# Patient Record
Sex: MALE | Race: OTHER | HISPANIC OR LATINO | URBAN - METROPOLITAN AREA
[De-identification: names, ages, dates, MRNs, and addresses within clinical notes are randomized per-mention and may not be internally consistent; named-entity substitution may affect disease eponyms.]

---

## 2017-12-01 ENCOUNTER — EMERGENCY (EMERGENCY)
Facility: HOSPITAL | Age: 27
LOS: 1 days | Discharge: HOME | End: 2017-12-01
Attending: EMERGENCY MEDICINE | Admitting: EMERGENCY MEDICINE
Payer: MEDICAID

## 2017-12-01 VITALS
HEART RATE: 66 BPM | TEMPERATURE: 98 F | DIASTOLIC BLOOD PRESSURE: 69 MMHG | SYSTOLIC BLOOD PRESSURE: 129 MMHG | RESPIRATION RATE: 18 BRPM

## 2017-12-01 PROCEDURE — 99282 EMERGENCY DEPT VISIT SF MDM: CPT

## 2017-12-01 NOTE — ED PROVIDER NOTE - PLAN OF CARE
You were given surgicel to stop the bleeding from your thumb. Return for rebleeding. You can remove the gauze in 24 hours, keep the area clean and dry.     1) Please follow-up with your primary care doctor within the next 3 days.  Please call today or tomorrow for an appointment.  If you cannot follow-up with your doctor(s), please return to the ED for any urgent issues.  2) If you have any worsening of symptoms or any other concerns please return to the ED immediately.  3) Please continue taking your home medications as directed.

## 2017-12-01 NOTE — ED PROVIDER NOTE - CHIEF COMPLAINT
The patient is a 27y Male complaining of The patient is a 27y Male complaining of bleeding from left thumb

## 2017-12-01 NOTE — ED PROVIDER NOTE - PROGRESS NOTE DETAILS
Hemostatic wound with surgicel application, patient given rest of packet to go, given return precautions for further bleeding, Hemostatic wound with surgicel application, patient given rest of packet to go, given return precautions for further bleeding/ wound care

## 2017-12-01 NOTE — ED PROVIDER NOTE - CARE PLAN
Principal Discharge DX:	Bleeding  Instructions for follow-up, activity and diet:	You were given surgicel to stop the bleeding from your thumb. Return for rebleeding. You can remove the gauze in 24 hours, keep the area clean and dry.     1) Please follow-up with your primary care doctor within the next 3 days.  Please call today or tomorrow for an appointment.  If you cannot follow-up with your doctor(s), please return to the ED for any urgent issues.  2) If you have any worsening of symptoms or any other concerns please return to the ED immediately.  3) Please continue taking your home medications as directed.  Secondary Diagnosis:	Thumb laceration, left, sequela

## 2017-12-01 NOTE — ED PROVIDER NOTE - SKIN WOUND TYPE
bleeding laceration of left thumb fingertip, 2 retained sutures in place, epithelization of wound edges, no cellulitic changes/crepits/discharge, no discoloration of the nail bed/LACERATION(S)

## 2017-12-01 NOTE — ED PROVIDER NOTE - OBJECTIVE STATEMENT
27M no pmhx, recently cut his left thumb on a kendra Monday, went to [facility] and had two sutures placed but today wound continued to bleed. No other complaints, no new injuries 27M no pmhx, left handed, recently cut his left thumb on a kendra 5 days ago, went to [facility] and had two sutures placed but wound continued to bleed, today bleeding has increased. No new injuries, no inflammation of the thumb or joint, no decreased ROM of the fingers of that hand.

## 2017-12-01 NOTE — ED PROVIDER NOTE - ATTENDING CONTRIBUTION TO CARE
I performed a face to face bedside interview with patient regarding history of present illness, review of symptoms and past medical history. I completed an independent physical exam.  I have discussed patient's plan of care.   I agree with note as stated above, having amended the EMR as needed to reflect my findings. I have discussed the assessment and plan of care.  This includes during the time I functioned as the attending physician for this patient.  Attending Contribution to Care:agree with plan of resident. pt p/w mild oozing from laceration site. wound over 48 hours. placed surgicell and no more active bleeding.

## 2017-12-01 NOTE — ED PROVIDER NOTE - MEDICAL DECISION MAKING DETAILS
27M no pmhx, c/o bleeding from thumb, past epithelization stage, will surgicel for hemostasis and give return precautions,

## 2017-12-01 NOTE — ED ADULT TRIAGE NOTE - CHIEF COMPLAINT QUOTE
Laceration to left thumb with a kendra, this past Monday.  As per pt was evaluated at [place], and sutures were placed, but has not stopped bleeding. Dressing in place, but UTD with Tetanus.